# Patient Record
Sex: MALE | Race: WHITE | Employment: FULL TIME | URBAN - METROPOLITAN AREA
[De-identification: names, ages, dates, MRNs, and addresses within clinical notes are randomized per-mention and may not be internally consistent; named-entity substitution may affect disease eponyms.]

---

## 2018-03-30 ENCOUNTER — OFFICE VISIT (OUTPATIENT)
Dept: SURGERY | Age: 53
End: 2018-03-30
Payer: COMMERCIAL

## 2018-03-30 VITALS
HEART RATE: 78 BPM | HEIGHT: 73 IN | SYSTOLIC BLOOD PRESSURE: 156 MMHG | RESPIRATION RATE: 16 BRPM | WEIGHT: 241 LBS | BODY MASS INDEX: 31.94 KG/M2 | DIASTOLIC BLOOD PRESSURE: 92 MMHG | OXYGEN SATURATION: 96 % | TEMPERATURE: 98.1 F

## 2018-03-30 DIAGNOSIS — L82.1 SK (SEBORRHEIC KERATOSIS): Primary | ICD-10-CM

## 2018-03-30 PROCEDURE — 99203 OFFICE O/P NEW LOW 30 MIN: CPT | Performed by: PHYSICIAN ASSISTANT

## 2018-03-30 NOTE — PROGRESS NOTES
Department of Plastic Surgery - Adult  Attending Consult Note      CHIEF COMPLAINT:  Lesion of back x 2     History Obtained From:  patient    HISTORY OF PRESENT ILLNESS:                The patient is a 48 y.o. male who presents with suspicious skin lesion of right mid lower back and right mid flank. The patient states that they first noticed the lesion several months ago. They have grown in size since they first noticed the lesion. The lesions have not changed in color and have not had discharge or bleeding. The pt has not had the lesions biopsied previously. The patient has not had the lesion removed previously. The patient states the lesions are not painfull. The pt denies any associated symptoms. Past Medical History:    Past Medical History:   Diagnosis Date    High cholesterol     Hypertension     Hypothyroidism      Past Surgical History:    History reviewed. No pertinent surgical history. Current Medications:   No current facility-administered medications for this visit. Allergies:  Patient has no known allergies. Social History:   Social History     Social History    Marital status: Single     Spouse name: N/A    Number of children: N/A    Years of education: N/A     Occupational History    Not on file. Social History Main Topics    Smoking status: Never Smoker    Smokeless tobacco: Former User     Types: Chew    Alcohol use Yes    Drug use: No    Sexual activity: Not on file     Other Topics Concern    Not on file     Social History Narrative    No narrative on file     Family History:   History reviewed. No pertinent family history. REVIEW OF SYSTEMS:    CONSTITUTIONAL:  negative for  fevers, chills, sweats and fatigue  EYES: negative for dipolpia or acute vision loss. RESPIRATORY:  negative for  dry cough, cough with sputum, dyspnea, wheezing and chest pain  HENT:negative for pain, headache, difficulty swallowing or nose bleeds.   CARDIOVASCULAR:  negative for chest pain, dyspnea, palpitations, syncope  GASTROINTESTINAL:  negative for nausea, vomiting, change in bowel habits, diarrhea, constipation and abdominal pain  EXTREMITIES: negative for edema  MUSCULOSKELETAL: negative for muscle weakness  SKIN: positive for lesionnegative for itching or rashes. HEME: negative for easy brusing or bleeding  BEHAVIOR/PSYCH:  negative for poor appetite, increased appetite, decreased sleep and poor concentration      PHYSICAL EXAM:    VITALS:  BP (!) 156/92   Pulse 78   Temp 98.1 °F (36.7 °C) (Oral)   Resp 16   Ht 6' 1\" (1.854 m)   Wt 241 lb (109.3 kg)   SpO2 96%   BMI 31.80 kg/m²   CONSTITUTIONAL:  awake, alert, cooperative, no apparent distress, and appears stated age  EYES: PERRLA, EOMI, no signs of occular infection  LUNGS:  No increased work of breathing, good air exchange  CARDIOVASCULAR:  Normal apical impulse, regular rate and rhythm   EXTREMITIES: no signs of clubbing or cyanosis. MUSCULOSKELETAL: negative for flaccid muscle tone or spastic movements. NEURO: Cranial nerves II-XII grossly intact. No signs of agitated mood. SKIN: Right mid lower back-  9mm x 7mm,  Skin tone in color, irregular border, raised, no signs of bleeding,drainage or infection. Non tender to palpation. Seborrheic keratosis  SKIN: Right mid lower flank-  10mm x 6mm,  Skin tone in color, irregular border, raised, no signs of bleeding,drainage or infection. Non tender to palpation. Seborrheic keratosis    DATA:    Labs: CBC: No results found for: WBC, RBC, HGB, HCT, MCV, MCH, MCHC, RDW, PLT, MPV  BMP:  No results found for: NA, K, CL, CO2, BUN, LABALBU, CREATININE, CALCIUM, GFRAA, LABGLOM, GLUCOSE    Radiology Review:  No radiology needed at this time  Pathology Review: No Pathology reviewed       IMPRESSION/RECOMMENDATIONS:        Diagnosis  -) Seborrheic keratosis of back ×2.     Educated the patient that these lesions are benign and he like to have the removed we can do so for a cosmetic

## 2020-04-28 ENCOUNTER — OFFICE VISIT (OUTPATIENT)
Dept: FAMILY MEDICINE CLINIC | Age: 55
End: 2020-04-28
Payer: COMMERCIAL

## 2020-04-28 VITALS
SYSTOLIC BLOOD PRESSURE: 156 MMHG | DIASTOLIC BLOOD PRESSURE: 94 MMHG | RESPIRATION RATE: 18 BRPM | TEMPERATURE: 98.1 F | HEART RATE: 94 BPM | OXYGEN SATURATION: 95 % | HEIGHT: 73 IN | BODY MASS INDEX: 31.8 KG/M2

## 2020-04-28 PROCEDURE — 99213 OFFICE O/P EST LOW 20 MIN: CPT | Performed by: PHYSICIAN ASSISTANT

## 2020-04-28 PROCEDURE — 96372 THER/PROPH/DIAG INJ SC/IM: CPT | Performed by: PHYSICIAN ASSISTANT

## 2020-04-28 RX ORDER — PREDNISONE 20 MG/1
TABLET ORAL
Qty: 10 TABLET | Refills: 0 | Status: SHIPPED
Start: 2020-04-28 | End: 2020-07-22 | Stop reason: CLARIF

## 2020-04-28 RX ORDER — METHYLPREDNISOLONE ACETATE 40 MG/ML
40 INJECTION, SUSPENSION INTRA-ARTICULAR; INTRALESIONAL; INTRAMUSCULAR; SOFT TISSUE ONCE
Status: COMPLETED | OUTPATIENT
Start: 2020-04-28 | End: 2020-04-28

## 2020-04-28 RX ORDER — CYCLOBENZAPRINE HCL 10 MG
10 TABLET ORAL NIGHTLY PRN
Qty: 10 TABLET | Refills: 0 | Status: SHIPPED | OUTPATIENT
Start: 2020-04-28 | End: 2020-05-08

## 2020-04-28 RX ORDER — KETOROLAC TROMETHAMINE 30 MG/ML
30 INJECTION, SOLUTION INTRAMUSCULAR; INTRAVENOUS ONCE
Status: COMPLETED | OUTPATIENT
Start: 2020-04-28 | End: 2020-04-28

## 2020-04-28 RX ADMIN — KETOROLAC TROMETHAMINE 30 MG: 30 INJECTION, SOLUTION INTRAMUSCULAR; INTRAVENOUS at 12:44

## 2020-04-28 RX ADMIN — METHYLPREDNISOLONE ACETATE 40 MG: 40 INJECTION, SUSPENSION INTRA-ARTICULAR; INTRALESIONAL; INTRAMUSCULAR; SOFT TISSUE at 12:43

## 2020-04-28 ASSESSMENT — ENCOUNTER SYMPTOMS
RESPIRATORY NEGATIVE: 1
BACK PAIN: 1
EYES NEGATIVE: 1
GASTROINTESTINAL NEGATIVE: 1

## 2020-07-22 ENCOUNTER — OFFICE VISIT (OUTPATIENT)
Dept: SURGERY | Age: 55
End: 2020-07-22

## 2020-07-22 VITALS
WEIGHT: 241.3 LBS | OXYGEN SATURATION: 97 % | RESPIRATION RATE: 16 BRPM | DIASTOLIC BLOOD PRESSURE: 84 MMHG | SYSTOLIC BLOOD PRESSURE: 140 MMHG | BODY MASS INDEX: 31.98 KG/M2 | HEART RATE: 69 BPM | TEMPERATURE: 97.3 F | HEIGHT: 73 IN

## 2020-07-22 NOTE — PROGRESS NOTES
Office Procedure:    Diagnosis: Multiple skin tags    Location: Neck    Surgeon: Rolando Agosto MD    Specimen: None    Procedure:  After informed consent, the neck bilaterally was prepped in sterile fashion. 1% lidocaine with epinephrine was infiltrated in the base the skin tags. Using 15 blade scalpel the skin tags were excised at their base. Hemostasis was ensured with pressure. Bacitracin and bandages were applied. Procedure was tolerated.     Rolando Agosto MD  7/22/20  2:05 PM

## 2020-07-22 NOTE — PROGRESS NOTES
MA helped to assist Dr. Amber Alexander remove skin tags. Dr. Ángel Haddad used a 10ML lidocaine with epi. Patient tolerated procedure well.     Lot# [id number]  NDC# 51372-474-34  EXP: 10/20    Electronically signed by Benigno Jang on 7/22/20 at 3:09 PM EDT

## 2021-09-22 ENCOUNTER — OFFICE VISIT (OUTPATIENT)
Dept: FAMILY MEDICINE CLINIC | Age: 56
End: 2021-09-22
Payer: COMMERCIAL

## 2021-09-22 VITALS
SYSTOLIC BLOOD PRESSURE: 136 MMHG | HEART RATE: 64 BPM | OXYGEN SATURATION: 96 % | WEIGHT: 240.8 LBS | TEMPERATURE: 96.9 F | RESPIRATION RATE: 18 BRPM | HEIGHT: 73 IN | BODY MASS INDEX: 31.91 KG/M2 | DIASTOLIC BLOOD PRESSURE: 84 MMHG

## 2021-09-22 DIAGNOSIS — M54.42 ACUTE BILATERAL LOW BACK PAIN WITH BILATERAL SCIATICA: ICD-10-CM

## 2021-09-22 DIAGNOSIS — M54.41 ACUTE BILATERAL LOW BACK PAIN WITH BILATERAL SCIATICA: ICD-10-CM

## 2021-09-22 PROCEDURE — 99213 OFFICE O/P EST LOW 20 MIN: CPT | Performed by: NURSE PRACTITIONER

## 2021-09-22 PROCEDURE — 96372 THER/PROPH/DIAG INJ SC/IM: CPT | Performed by: NURSE PRACTITIONER

## 2021-09-22 RX ORDER — PREDNISONE 10 MG/1
TABLET ORAL
Qty: 18 TABLET | Refills: 0 | Status: SHIPPED | OUTPATIENT
Start: 2021-09-22 | End: 2021-10-01

## 2021-09-22 RX ORDER — METHYLPREDNISOLONE ACETATE 40 MG/ML
40 INJECTION, SUSPENSION INTRA-ARTICULAR; INTRALESIONAL; INTRAMUSCULAR; SOFT TISSUE ONCE
Status: CANCELLED | OUTPATIENT
Start: 2021-09-22 | End: 2021-09-22

## 2021-09-22 RX ORDER — KETOROLAC TROMETHAMINE 30 MG/ML
30 INJECTION, SOLUTION INTRAMUSCULAR; INTRAVENOUS ONCE
Status: COMPLETED | OUTPATIENT
Start: 2021-09-22 | End: 2021-09-22

## 2021-09-22 RX ORDER — KETOROLAC TROMETHAMINE 30 MG/ML
30 INJECTION, SOLUTION INTRAMUSCULAR; INTRAVENOUS ONCE
Qty: 1 ML | Refills: 0 | Status: CANCELLED
Start: 2021-09-22 | End: 2021-09-22

## 2021-09-22 RX ORDER — PREDNISONE 10 MG/1
10 TABLET ORAL DAILY
Qty: 10 TABLET | Refills: 0 | Status: CANCELLED | OUTPATIENT
Start: 2021-09-22 | End: 2021-10-02

## 2021-09-22 RX ORDER — METHYLPREDNISOLONE ACETATE 40 MG/ML
40 INJECTION, SUSPENSION INTRA-ARTICULAR; INTRALESIONAL; INTRAMUSCULAR; SOFT TISSUE ONCE
Status: COMPLETED | OUTPATIENT
Start: 2021-09-22 | End: 2021-09-22

## 2021-09-22 RX ORDER — LEVOTHYROXINE SODIUM 75 MCG
TABLET ORAL
COMMUNITY
Start: 2021-06-26

## 2021-09-22 RX ADMIN — KETOROLAC TROMETHAMINE 30 MG: 30 INJECTION, SOLUTION INTRAMUSCULAR; INTRAVENOUS at 08:40

## 2021-09-22 RX ADMIN — METHYLPREDNISOLONE ACETATE 40 MG: 40 INJECTION, SUSPENSION INTRA-ARTICULAR; INTRALESIONAL; INTRAMUSCULAR; SOFT TISSUE at 08:42

## 2021-09-22 NOTE — PROGRESS NOTES
Chief Complaint:   Back Pain (patient stated that this has been going on for about a week. . was touchy at first now it is more discomforting )    History of Present Illness   Source of history provided by:  patient. Ryna Schaefer is a 49 y.o. old male who presents to the walk in clinic for evaluation of lower back pain starting 1 week ago. Pt denies any known injury. Pt has had back problems in the past.  Pt states the pain is worse with movement and improves with lying flat. There is  radiation of the pain into the buttocks/leg. Pt denies any bowel/bladder incontinence, saddle paraesthesia, abdominal pain, hematuria, N/V/D, fever, chills, HA, neck pain, recent illness, dysuria, or lethargy. Reports he used a TENS unit last night with some relief of the pain. Review of Systems    Unless otherwise stated in this report or unable to obtain because of the patient's clinical or mental status as evidenced by the medical record, this patients's positive and negative responses for Review of Systems, constitutional, psych, eyes, ENT, cardiovascular, respiratory, gastrointestinal, neurological, genitourinary, musculoskeletal, integument systems and systems related to the presenting problem are either stated in the preceding or were not pertinent or were negative for the symptoms and/or complaints related to the medical problem. Past Medical History:  has a past medical history of High cholesterol, Hypertension, and Hypothyroidism. Past Surgical History:  has no past surgical history on file. Social History:  reports that he has never smoked. He has quit using smokeless tobacco.  His smokeless tobacco use included chew. He reports current alcohol use. He reports that he does not use drugs. Family History: family history is not on file. Allergies: Patient has no known allergies.     Physical Exam   Vital Signs:  /84   Pulse 64   Temp 96.9 °F (36.1 °C)   Resp 18   Ht 6' 1\" (1.854 m)   Wt 240 lb 12.8 oz (109.2 kg)   SpO2 96%   BMI 31.77 kg/m²    Oxygen Saturation Interpretation: Normal.    Constitutional:  Alert, development consistent with age. Neck:  Normal ROM. Supple. No TTP. Lungs:  Clear to auscultation and breath sounds equal.  Heart:  Regular rate and rhythm, normal heart sounds, without pathological murmurs, ectopy, gallops, or rubs. Abdomen:  Soft, nontender, good bowel sounds. No firm or pulsatile mass. Back: Tenderness: TTP over bilateral lower back with no appreciable midline tenderness. Swelling: No edema. Range of Motion: Decreased lateral and front to back ROM due to pain. CVA Tenderness: No CVA tenderness bilaterally. Straight leg raising:  Negative straight leg raise. Skin:  No bruising, redness, abrasions, or rashes. Distal Function:              Motor deficit: Strength 5/5 in LE's bilaterally. Sensory deficit: Distal sensation intact. Pulse deficit: Distal pulses 2+ and bounding. Calf Tenderness:  No bilateral calf tenderness. Negative Keny's sign bilaterally               Reflexes: Intact at knee and achilles bilaterally. Gait:  No antalgia noted. Skin:  Normal turgor. Warm, dry, without visible rash. Neurological:  Alert and oriented. Motor functions intact. Test Results Section   (All laboratory and radiology results have been personally reviewed by myself)  Labs:  No results found for this visit on 09/22/21. Imaging: All Radiology results interpreted by Radiologist unless otherwise noted. No results found. Assessment / Plan   Impression(s):  Josi Hawkins was seen today for back pain. Diagnoses and all orders for this visit:    Acute bilateral low back pain with bilateral sciatica  -     ketorolac (TORADOL) injection 30 mg  -     predniSONE (DELTASONE) 10 MG tablet;  Take 3 tablets by mouth daily for 3 days, THEN 2 tablets daily for 3 days, THEN 1 tablet daily for 3 days. -     methylPREDNISolone acetate (DEPO-MEDROL) injection 40 mg    Toradol and Depo-medrol injection given in office today. Scripts written for Prednisone, side effects discussed. Advise rest, ice, and/or moist heat for additional relief. PCP in 1-2 weeks if symptoms persist. ED sooner if symptoms worsen or change. ED immediately with any fever, severe or worsening back pain, paresthesias, weakness, or GI/ incontinence. Pt states understanding and is in agreement with this care plan. All questions answered. Administrations This Visit     ketorolac (TORADOL) injection 30 mg     Admin Date  09/22/2021 Action  Given Dose  30 mg Route  IntraMUSCular Administered By  Arsen Hardy, LPN          methylPREDNISolone acetate (DEPO-MEDROL) injection 40 mg     Admin Date  09/22/2021 Action  Given Dose  40 mg Route  IntraMUSCular Administered By  Arsenstevie Hardy, APPLEN            Return if symptoms worsen or fail to improve. Electronically signed by ALE Gaston CNP   DD: 9/22/21    **This report was transcribed using voice recognition software. Every effort was made to ensure accuracy; however, inadvertent computerized transcription errors may be present.

## 2024-01-10 ENCOUNTER — OFFICE VISIT (OUTPATIENT)
Dept: FAMILY MEDICINE CLINIC | Age: 59
End: 2024-01-10
Payer: COMMERCIAL

## 2024-01-10 VITALS
OXYGEN SATURATION: 97 % | WEIGHT: 243.2 LBS | HEART RATE: 56 BPM | TEMPERATURE: 97.6 F | HEIGHT: 73 IN | BODY MASS INDEX: 32.23 KG/M2

## 2024-01-10 DIAGNOSIS — U07.1 COVID-19: Primary | ICD-10-CM

## 2024-01-10 LAB
Lab: ABNORMAL
PERFORMING INSTRUMENT: ABNORMAL
QC PASS/FAIL: ABNORMAL
SARS-COV-2, POC: DETECTED

## 2024-01-10 PROCEDURE — 99213 OFFICE O/P EST LOW 20 MIN: CPT | Performed by: FAMILY MEDICINE

## 2024-01-10 PROCEDURE — 87426 SARSCOV CORONAVIRUS AG IA: CPT | Performed by: FAMILY MEDICINE

## 2024-01-10 RX ORDER — METOPROLOL TARTRATE 50 MG/1
50 TABLET, FILM COATED ORAL
COMMUNITY
Start: 2023-08-30

## 2024-01-10 RX ORDER — PANTOPRAZOLE SODIUM 40 MG/1
40 TABLET, DELAYED RELEASE ORAL DAILY
COMMUNITY
Start: 2023-11-03

## 2024-01-10 ASSESSMENT — ENCOUNTER SYMPTOMS
EYE DISCHARGE: 0
SORE THROAT: 0
EYE REDNESS: 0
VOMITING: 0
DIARRHEA: 0
SHORTNESS OF BREATH: 0
TROUBLE SWALLOWING: 0
BLOOD IN STOOL: 0
CHEST TIGHTNESS: 0
SINUS PAIN: 0
BACK PAIN: 0
NAUSEA: 0
EYE PAIN: 0
PHOTOPHOBIA: 0
ABDOMINAL PAIN: 0
ALLERGIC/IMMUNOLOGIC NEGATIVE: 1
COUGH: 1

## 2024-01-10 NOTE — PROGRESS NOTES
1/10/24  Gilmar Badillo : 1965 Sex: male  Age: 59 y.o.      Assessment and Plan:  Gilmar was seen today for positive for covid-19.    Diagnoses and all orders for this visit:    COVID-19  -     POCT COVID-19, Antigen    Positive home test was confirmed by rapid antigen testing here.  He is to self isolate for the next 5 days and then may return with mask usage.  He can return to work next Monday.  Symptomatic treatment can include Tylenol, fluids, rest, Mucinex, Claritin, coolmist vaporizer.  If complaints do not improve, or worsen in any way, follow-up with his PCP.    Return 3 to 5-day recheck with PCP if not improving.    Chief Complaint   Patient presents with    Positive For Covid-19     Tested positive at home yesterday; symptoms started Monday; fever, body aches, tickle in throat; wants to confirm that he does have covid due to his home test being        Congestion, pressure, drainage, facial tenderness, mild headache, onset 2 days ago.  Denies fever, chills, diaphoresis, nausea, vomiting, decreased oral intake. Denies other GI or  complaints.   OTC treatments minimally effective.          Review of Systems   Constitutional:  Negative for appetite change, fatigue and unexpected weight change.   HENT:  Positive for congestion and postnasal drip. Negative for ear pain, hearing loss, sinus pain, sore throat and trouble swallowing.    Eyes:  Negative for photophobia, pain, discharge and redness.   Respiratory:  Positive for cough. Negative for chest tightness and shortness of breath.    Cardiovascular:  Negative for chest pain, palpitations and leg swelling.   Gastrointestinal:  Negative for abdominal pain, blood in stool, diarrhea, nausea and vomiting.   Endocrine: Negative.    Genitourinary:  Negative for dysuria, flank pain, frequency and hematuria.   Musculoskeletal:  Negative for arthralgias, back pain, joint swelling and myalgias.   Skin: Negative.    Allergic/Immunologic: Negative.

## 2025-01-02 ENCOUNTER — OFFICE VISIT (OUTPATIENT)
Dept: FAMILY MEDICINE CLINIC | Age: 60
End: 2025-01-02

## 2025-01-02 VITALS
DIASTOLIC BLOOD PRESSURE: 72 MMHG | TEMPERATURE: 97.3 F | OXYGEN SATURATION: 98 % | WEIGHT: 236 LBS | SYSTOLIC BLOOD PRESSURE: 128 MMHG | HEART RATE: 60 BPM | RESPIRATION RATE: 18 BRPM | BODY MASS INDEX: 31.14 KG/M2

## 2025-01-02 DIAGNOSIS — R09.89 CHEST CONGESTION: ICD-10-CM

## 2025-01-02 DIAGNOSIS — R06.2 WHEEZE: ICD-10-CM

## 2025-01-02 DIAGNOSIS — R05.1 ACUTE COUGH: Primary | ICD-10-CM

## 2025-01-02 LAB
INFLUENZA A ANTIBODY: NORMAL
INFLUENZA B ANTIBODY: NORMAL
Lab: NORMAL
PERFORMING INSTRUMENT: NORMAL
QC PASS/FAIL: NORMAL
SARS-COV-2, POC: NORMAL

## 2025-01-02 RX ORDER — PREDNISONE 10 MG/1
TABLET ORAL
Qty: 12 TABLET | Refills: 0 | Status: SHIPPED | OUTPATIENT
Start: 2025-01-02 | End: 2025-01-07

## 2025-01-02 RX ORDER — ALBUTEROL SULFATE 90 UG/1
2 INHALANT RESPIRATORY (INHALATION) 4 TIMES DAILY PRN
Qty: 18 G | Refills: 0 | Status: SHIPPED | OUTPATIENT
Start: 2025-01-02

## 2025-01-02 RX ORDER — BENZONATATE 100 MG/1
100 CAPSULE ORAL 3 TIMES DAILY PRN
Qty: 30 CAPSULE | Refills: 0 | Status: SHIPPED | OUTPATIENT
Start: 2025-01-02 | End: 2025-01-12

## 2025-01-03 ASSESSMENT — ENCOUNTER SYMPTOMS
SHORTNESS OF BREATH: 0
EYES NEGATIVE: 1
DIARRHEA: 0
ABDOMINAL PAIN: 0
SINUS PRESSURE: 0
WHEEZING: 1
COUGH: 1
NAUSEA: 0
SORE THROAT: 0
VOMITING: 0
CHEST TIGHTNESS: 0

## 2025-01-03 NOTE — PROGRESS NOTES
[facility]     1/3/25  Gilmar Badillo : 1965 Sex: male  Age: 59 y.o.    Chief Complaint   Patient presents with    Cough     Sx x3d     Chest Congestion       HPI  Patient presents to express care today complaining of cough, chest congestion, wheezing over the past 3 days.  Denies any shortness of breath with this.  No fever or chills.  States his wife has been ill with similar symptoms.  Pulse ox on room air is 98%.          Review of Systems   Constitutional:  Negative for chills and fever.   HENT:  Positive for congestion. Negative for ear pain, postnasal drip, sinus pressure and sore throat.    Eyes: Negative.    Respiratory:  Positive for cough and wheezing. Negative for chest tightness and shortness of breath.    Cardiovascular:  Negative for chest pain.   Gastrointestinal:  Negative for abdominal pain, diarrhea, nausea and vomiting.   Musculoskeletal:  Negative for myalgias.   Neurological:  Negative for headaches.               REST OF PERTINENT ROS GONE OVER AND WAS NEGATIVE.                 Current Outpatient Medications:     amoxicillin-clavulanate (AUGMENTIN) 875-125 MG per tablet, Take 1 tablet by mouth 2 times daily for 7 days, Disp: 14 tablet, Rfl: 0    benzonatate (TESSALON) 100 MG capsule, Take 1 capsule by mouth 3 times daily as needed for Cough, Disp: 30 capsule, Rfl: 0    predniSONE (DELTASONE) 10 MG tablet, Take 3 tablets by mouth daily for 2 days, THEN 2 tablets daily for 2 days, THEN 1 tablet daily for 2 days., Disp: 12 tablet, Rfl: 0    albuterol sulfate HFA (VENTOLIN HFA) 108 (90 Base) MCG/ACT inhaler, Inhale 2 puffs into the lungs 4 times daily as needed for Wheezing or Shortness of Breath, Disp: 18 g, Rfl: 0    pantoprazole (PROTONIX) 40 MG tablet, Take 1 tablet by mouth daily, Disp: , Rfl:     metFORMIN (GLUCOPHAGE) 500 MG tablet, Take 1 tablet by mouth 2 times daily (with meals), Disp: , Rfl:     metoprolol tartrate (LOPRESSOR) 50 MG tablet, Take 1 tablet by mouth,